# Patient Record
Sex: MALE | Race: WHITE | NOT HISPANIC OR LATINO | Employment: FULL TIME | URBAN - METROPOLITAN AREA
[De-identification: names, ages, dates, MRNs, and addresses within clinical notes are randomized per-mention and may not be internally consistent; named-entity substitution may affect disease eponyms.]

---

## 2021-06-03 ENCOUNTER — RECORDS - HEALTHEAST (OUTPATIENT)
Dept: ADMINISTRATIVE | Facility: CLINIC | Age: 36
End: 2021-06-03

## 2021-10-04 ENCOUNTER — HOSPITAL ENCOUNTER (EMERGENCY)
Facility: HOSPITAL | Age: 36
Discharge: HOME | End: 2021-10-04
Admitting: FAMILY MEDICINE
Payer: OTHER MISCELLANEOUS

## 2021-10-04 VITALS
TEMPERATURE: 97.9 F | OXYGEN SATURATION: 96 % | SYSTOLIC BLOOD PRESSURE: 134 MMHG | DIASTOLIC BLOOD PRESSURE: 94 MMHG | HEART RATE: 85 BPM | HEIGHT: 71 IN | WEIGHT: 210 LBS | RESPIRATION RATE: 17 BRPM | BODY MASS INDEX: 29.4 KG/M2

## 2021-10-04 DIAGNOSIS — M54.41 ACUTE RIGHT-SIDED LOW BACK PAIN WITH RIGHT-SIDED SCIATICA: Primary | ICD-10-CM

## 2021-10-04 PROCEDURE — 99284 EMERGENCY DEPT VISIT MOD MDM: CPT

## 2021-10-04 RX ORDER — GABAPENTIN 300 MG/1
300 CAPSULE ORAL 3 TIMES DAILY
Qty: 30 CAPSULE | Refills: 0 | Status: SHIPPED | OUTPATIENT
Start: 2021-10-04 | End: 2021-10-27

## 2021-10-04 RX ORDER — PREDNISONE 20 MG/1
TABLET ORAL
Qty: 10 TABLET | Refills: 0 | Status: SHIPPED | OUTPATIENT
Start: 2021-10-04 | End: 2021-10-27

## 2021-10-04 ASSESSMENT — ENCOUNTER SYMPTOMS
FREQUENCY: 1
ROS GI COMMENTS: NEGATIVE FOR BOWEL INCONTINENCE.
BACK PAIN: 1

## 2021-10-04 ASSESSMENT — MIFFLIN-ST. JEOR: SCORE: 1899.68

## 2021-10-04 NOTE — ED TRIAGE NOTES
Pt having increasing low back pain felt more on the right side radiating to upper thigh.  Denies loss of control of bowel or bladder but noticing increasing urination.  Has had work related injury of his back and had been having 3 weeks of PT.  Noticed that Fri Pt was more intensive and  Sunday pain got worse.  Would like to do an MRI

## 2021-10-04 NOTE — ED PROVIDER NOTES
EMERGENCY DEPARTMENT ENCOUNTER      NAME: Randy Hernandez  AGE: 36 year old male  YOB: 1985  MRN: [id number]  EVALUATION DATE & TIME: No admission date for patient encounter.    PCP: No primary care provider on file.    ED PROVIDER: Miki Hernandez M.D.    Chief Complaint   Patient presents with     Back Pain       FINAL IMPRESSION:  1. Acute right-sided low back pain with right-sided sciatica        ED COURSE & MEDICAL DECISION MAKING:    Pertinent Labs & Imaging studies personally reviewed and interpreted by me. (See chart for details)  2:14 PM Patient seen and examined, prior records reviewed.  Differential diagnosis includes but not limited to lumbar strain, lumbar sprain, vertebral fracture, compression fracture, cauda equina syndrome, epidural abscess, spondylolisthesis, discitis, osteomyelitis, aortic dissection, aortic aneurysm.  Patient presents with several weeks of back pain, worse over the last couple days.  Pain does radiate into the right upper thigh.  No saddle anesthesia, no bowel or bladder incontinence, no decreased sensation or strength of the lower extremities, no midline tenderness to percussion, no fever.  2:17 PM I discussed patient follow up with the spine center. We discussed the plan for discharge and the patient is agreeable. Reviewed supportive cares, symptomatic treatment, outpatient follow up, and reasons to return to the Emergency Department. Patient to be discharged by ED RN.         At the conclusion of the encounter I discussed the results of all of the tests and the disposition. The questions were answered. The patient or family acknowledged understanding and was agreeable with the care plan.     PROCEDURES:   Procedures    MEDICATIONS GIVEN IN THE EMERGENCY:  Medications - No data to display    NEW PRESCRIPTIONS STARTED AT TODAY'S ER VISIT  Discharge Medication List as of 10/4/2021  2:54 PM      START taking these medications    Details   gabapentin (NEURONTIN)  300 MG capsule Take 1 capsule (300 mg) by mouth 3 times daily, Disp-30 capsule, R-0, E-Prescribe      predniSONE (DELTASONE) 20 MG tablet Take two tablets (= 40mg) each day for 5 (five) days, Disp-10 tablet, R-0, E-Prescribe             =================================================================    HPI    Patient information was obtained from: Patient      Randy Hernandez is a 36 year old male who presents to this ED by walk in for evaluation of back pain.    The patient reports he sustained a lower back injury 5 weeks ago. He began physical therapy 3 weeks ago. This pat Friday his physical therapy was a little more aggressive. On Saturday and Sunday the pain in his lower back got really bad. It radiates into his buttocks, saddle area, and down to his right knee. His pain was is worse when sitting, and he feels like a pressure is building up in his back. His pain is the worst when he tries to stand from a sitting position. His current pain feels similar to the pain he had the week following his injury. He previously had a course of steroids to help with the pain that he finished 2 weeks ago. He is currently treating his pain with Tylenol and ibuprofen. He does report some recent urinary frequency. Denies any bowel or bladder incontinence or any other complaints at this time.    REVIEW OF SYSTEMS   Review of Systems   Gastrointestinal:        Negative for bowel incontinence.   Genitourinary: Positive for frequency. Negative for enuresis.   Musculoskeletal: Positive for back pain (low).      All other systems reviewed and negative    PAST MEDICAL HISTORY:  Past Medical History:   Diagnosis Date     Aneurysm (H)     basilar artery     DJD (degenerative joint disease)        PAST SURGICAL HISTORY:  No past surgical history on file.    CURRENT MEDICATIONS:    No current facility-administered medications for this encounter.     Current Outpatient Medications   Medication     gabapentin (NEURONTIN) 300 MG capsule  "    predniSONE (DELTASONE) 20 MG tablet     dicyclomine (BENTYL) 20 mg tablet     ondansetron (ZOFRAN ODT) 8 MG disintegrating tablet     ondansetron (ZOFRAN) 4 MG tablet       ALLERGIES:  Allergies   Allergen Reactions     Codeine Unknown     Sulfa (Sulfonamide Antibiotics) [Sulfa Drugs] Unknown       FAMILY HISTORY:  No family history on file.    SOCIAL HISTORY:   Social History     Socioeconomic History     Marital status: Single     Spouse name: Not on file     Number of children: Not on file     Years of education: Not on file     Highest education level: Not on file   Occupational History     Not on file   Tobacco Use     Smoking status: Never Smoker   Substance and Sexual Activity     Alcohol use: No     Drug use: Yes     Types: Marijuana     Sexual activity: Not on file   Other Topics Concern     Not on file   Social History Narrative     Not on file     Social Determinants of Health     Financial Resource Strain:      Difficulty of Paying Living Expenses:    Food Insecurity:      Worried About Running Out of Food in the Last Year:      Ran Out of Food in the Last Year:    Transportation Needs:      Lack of Transportation (Medical):      Lack of Transportation (Non-Medical):    Physical Activity:      Days of Exercise per Week:      Minutes of Exercise per Session:    Stress:      Feeling of Stress :    Social Connections:      Frequency of Communication with Friends and Family:      Frequency of Social Gatherings with Friends and Family:      Attends Anabaptism Services:      Active Member of Clubs or Organizations:      Attends Club or Organization Meetings:      Marital Status:    Intimate Partner Violence:      Fear of Current or Ex-Partner:      Emotionally Abused:      Physically Abused:      Sexually Abused:        VITALS:  BP (!) 134/94   Pulse 85   Temp 97.9  F (36.6  C) (Temporal)   Resp 17   Ht 1.803 m (5' 11\")   Wt 95.3 kg (210 lb)   SpO2 96%   BMI 29.29 kg/m      PHYSICAL EXAM:  Physical " Exam  Vitals and nursing note reviewed.   Constitutional:       Appearance: Normal appearance.   HENT:      Head: Normocephalic and atraumatic.      Right Ear: External ear normal.      Left Ear: External ear normal.      Nose: Nose normal.   Eyes:      Extraocular Movements: Extraocular movements intact.      Conjunctiva/sclera: Conjunctivae normal.      Pupils: Pupils are equal, round, and reactive to light.   Pulmonary:      Effort: Pulmonary effort is normal.   Musculoskeletal:         General: No swelling or deformity. Normal range of motion.      Cervical back: Normal range of motion.      Comments: Right lower lumbar tenderness. Decreased flexion and decreased extension of the lumbar spine.   Neurological:      General: No focal deficit present.      Mental Status: He is alert and oriented to person, place, and time. Mental status is at baseline.   Psychiatric:         Mood and Affect: Mood normal.         Behavior: Behavior normal.         Thought Content: Thought content normal.       I, Ole Reed, am serving as a scribe to document services personally performed by Dr. Hernandez based on my observation and the provider's statements to me. I, Miki Hernandez MD attest that Ole Reed is acting in a scribe capacity, has observed my performance of the services and has documented them in accordance with my direction.    Miki Hernandez M.D.  Emergency Medicine  [facility] EMERGENCY DEPARTMENT  [facility] [facility] [id number] [phone]  Dept: [phone]     Miki Hernandez MD  06/12/22 0859

## 2021-10-08 ENCOUNTER — OFFICE VISIT (OUTPATIENT)
Dept: PHYSICAL MEDICINE AND REHAB | Facility: CLINIC | Age: 36
End: 2021-10-08
Attending: FAMILY MEDICINE
Payer: OTHER MISCELLANEOUS

## 2021-10-08 VITALS
WEIGHT: 210 LBS | BODY MASS INDEX: 29.4 KG/M2 | HEIGHT: 71 IN | DIASTOLIC BLOOD PRESSURE: 76 MMHG | SYSTOLIC BLOOD PRESSURE: 122 MMHG | TEMPERATURE: 98.1 F | HEART RATE: 79 BPM

## 2021-10-08 DIAGNOSIS — M54.41 ACUTE RIGHT-SIDED LOW BACK PAIN WITH RIGHT-SIDED SCIATICA: ICD-10-CM

## 2021-10-08 DIAGNOSIS — M51.26 LUMBAR DISC HERNIATION: Primary | ICD-10-CM
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
## 2021-10-08 PROCEDURE — 99215 OFFICE O/P EST HI 40 MIN: CPT | Performed by: PHYSICAL MEDICINE & REHABILITATION

## 2021-10-08 RX ORDER — NABUMETONE 500 MG/1
TABLET, FILM COATED ORAL
Qty: 90 TABLET | Refills: 1 | Status: SHIPPED | OUTPATIENT
Start: 2021-10-08

## 2021-10-08 RX ORDER — HYDROCODONE BITARTRATE AND ACETAMINOPHEN 5; 325 MG/1; MG/1
TABLET ORAL
Qty: 21 TABLET | Refills: 0 | Status: SHIPPED | OUTPATIENT
Start: 2021-10-08

## 2021-10-08 RX ORDER — GABAPENTIN 300 MG/1
CAPSULE ORAL
Qty: 270 CAPSULE | Refills: 2 | Status: SHIPPED | OUTPATIENT
Start: 2021-10-08

## 2021-10-08 ASSESSMENT — PAIN SCALES - GENERAL: PAINLEVEL: MILD PAIN (3)

## 2021-10-08 ASSESSMENT — MIFFLIN-ST. JEOR: SCORE: 1899.68
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
## 2021-10-08 NOTE — PATIENT INSTRUCTIONS
Randy    It was very nice to meet you.  I am sorry that you continue to have pain following your work injury.    MRI of your low back has been ordered today. Someone will call you to schedule this imaging study.  Please call the clinic at [phone] if no one calls you to schedule this.  After you have scheduled this, please call my office at [phone] to schedule an appointment to review the results.  Please make this appointment for at least 3 days after your imaging study to ensure that the radiologist has finalized the report by the time of your appointment.  If you have any difficulty scheduling an appointment in a timely manner, please call my nurse line at [phone].    Nabumetone (which is an anti-inflammatory) medication is prescribed today (please start this after you finish the course of prednisone).  Take 1 tablet 3 times a day as needed for pain.  This medication should be taken with food and water to prevent any stomach upset.  Do not take ibuprofen/Advil/Motrin/Aleve/naproxen while you take Nabumetone.  Please call if you have any side effects to  Nabumetone.  Please stop this medication if you develop any symptoms of COVID-19.      Norco 5/325 mg, 1 tablet at bedtime as needed for pain, number 21 tablets as prescribed today..  Please lock this medication up when you are not taking it.  Do not share this medication with other people.  Do not increase the dose without permission from your physician.  Do not drink alcohol while you take this medication as this can lead to death.  Do not take other pain medications without approval from your physician or this can also lead to death.  If you need a refill of this medication, you must come in to clinic by appointment.  Please call if you have any questions on how to take this medication.    Gabapentin (nerve pain medication) was prescribed today.  Please use the chart to increase the dose to an amount that controls your pain (do not exceed 3  tablets three times a day).  If you have any questions on how to increase the dose or any side effects to this medication, please call the clinic.    Gabapentin 300mg Dosing Chart    DATE  MORNING AFTERNOON BEDTIME    Day 1 0 0 1    Day 2 0 0 1    Day 3 0 0 1    Day 4 1 0 1    Day 5 1 0 1    Day 6 1 0 1    Day 7 1 1 1    Day 8 1 1 1    Day 9 1 1 1    Day 10 1 1 2    Day 11 1 1 2    Day 12 1 1 2    Day 13 2 1 2    Day 14 2 1 2    Day 15 2 1 2    Day 16 2 2 2    Day 17 2 2 2    Day 18 2 2 2    Day 19 2 2 3    Day 20 2 2 3    Day 21 2 2 3    Day 22 3 2 3    Day 23 3 2 3    Day 24 3 2 3    Day 25 3 3 3    Day 26 3 3 3    Day 27 3 3 3     Continue medication, taking 3 capsules three times daily    Please call if you have any questions regarding how to take your medication  Clinic Phone # [phone]        If you have any questions or concerns prior to your follow-up appointment, please call the nurse line at [phone].    Thanks, Randy!  Dr. Ro

## 2021-10-15 ENCOUNTER — HOSPITAL ENCOUNTER (OUTPATIENT)
Dept: MRI IMAGING | Facility: HOSPITAL | Age: 36
Discharge: HOME | End: 2021-10-15
Attending: PHYSICAL MEDICINE & REHABILITATION | Admitting: PHYSICAL MEDICINE & REHABILITATION
Payer: OTHER MISCELLANEOUS

## 2021-10-15 ENCOUNTER — TELEPHONE (OUTPATIENT)
Dept: PHYSICAL MEDICINE AND REHAB | Facility: CLINIC | Age: 36
End: 2021-10-15

## 2021-10-15 DIAGNOSIS — M54.41 ACUTE RIGHT-SIDED LOW BACK PAIN WITH RIGHT-SIDED SCIATICA: ICD-10-CM

## 2021-10-15 PROCEDURE — 72148 MRI LUMBAR SPINE W/O DYE: CPT

## 2021-10-15 NOTE — TELEPHONE ENCOUNTER
"----- Message from Collette Ordoñez PA-C sent at 10/15/2021  3:35 PM CDT -----  Please call this patient and let him know that I reviewed the MRI lumbar spine since Dr. Ro is out of the office.  This shows mild disc bulges L3-4, L4-5, and L5-S1.  He should follow up with Dr. Ro to review.    Per Dr. Ro's note \"I will plan to see the patient back in person after he has had his MRI.  If he cannot get in in a timely fashion, he is asked to call the nurse navigation team who will notify me and I will find a spot that I can see him.\"  "

## 2021-10-15 NOTE — TELEPHONE ENCOUNTER
Call placed to patient with provider's results and recommendations.  Pt stated understanding. Pt was transferred to scheduling to make appt.

## 2021-10-16 ENCOUNTER — HEALTH MAINTENANCE LETTER (OUTPATIENT)
Age: 36
End: 2021-10-16

## 2021-10-26 NOTE — PROGRESS NOTES
Assessment:   Randy Hernandez is a 36 year old y.o. male with past medical history significant for asthma, irritable bowel syndrome, acid reflux, brain aneurysm who presents today for follow-up regarding acute right-sided low back pain with right radicular/sciatic type leg symptoms that started after work injury on August 31 of 2021.  At that time he was lifting a 350 pound piece of equipment.  Since his initial evaluation on October 8 of 2021, he has undergone an MRI of the lumbar spine which shows a slight disc protrusion at the L4-5 level which causes mild lateral recess stenosis without foraminal stenosis.  At this point his pain and mechanism of injury is most consistent with a lumbar sprain/strain.  He is noting improvement at this time which is in line with the timeframe expected for soft tissue injury to heal.  He is neurologically intact and without any red flag symptoms.    PSP:  Dr. Ro       Plan:     A shared decision making plan was used.  The patient's values and choices were respected.  The following represents what was discussed and decided upon by the physician and the patient.      1.  DIAGNOSTIC TESTS: The patient's MRI of the lumbar spine from October 15, 2021 was personally reviewed today by the physician with the physician performing her own interpretation.  There is a slight disc protrusion at the L4-5 level which causes lateral recess stenosis but no central canal stenosis or neural stenosis at this level.  There is a slight disc bulge with slight disc degeneration at the L5-S1 level, but there is no lateral recess stenosis, central canal stenosis or neuroforaminal stenosis at this level.  There is minimal facet arthropathy seen at the L3-4, L4-5, L5-S1 levels.  Otherwise this is a largely normal MRI.  2.  PHYSICAL THERAPY: The patient stopped physical therapy after it previously aggravated his symptoms significantly.  At this point he is doing well and he is interested to return to  physical therapy which I do think would be quite beneficial for him at this time to finish his rehabilitation course.  An order was provided to the [facility] location, where he was previously established as a patient.  He is encouraged to continue doing his home exercise program on a regular basis.  3.  MEDICATIONS: Patient has completed the prednisone course.  -At the last visit he was prescribed nabumetone 500 mg 3 times a day as needed for pain.  At this time, can continue to take this as needed.  -At the last visit, he was asked to increase the gabapentin.  He is currently taking gabapentin 100 mg in the morning, 600 mg in the afternoon and 900 mg at bedtime.  -At the last visit he was given Norco 5/325 mg, 1 tablet at bedtime as needed for pain.  At this time, he states that he never took the medication as he was afraid to take it in combination with the gabapentin.  He does not need to take it at this time if he does not have significant pain, though he does have a flareup after physical therapy starts, you could take it at bedtime to help him sleep.  4.  INTERVENTIONS: His pain is significantly better so I do not feel that any interventions are necessary at this time.  I do not anticipate that he would need injections given that his pain is most consistent with soft tissue injury.  5.  PATIENT EDUCATION:    -I did reassure the patient that his MRI findings are largely normal.  Again this lends itself to a lumbar sprain/strain injury after lifting heavy.  He is feeling significantly better, which is within the timeframe note a soft tissue injury would be expected to heal.  -He is in agreement with returning to physical therapy.  -He reports that he tried to return to work, but his work cannot accommodate the no ladders/stairs.  He reports that while stairs do somewhat aggravate pain, he does think that he would be able to do them.  He says he is concerned about being up on the ladder.  He  is in agreement that he could do stairs but not the latter and then his work might be able to accommodate the restrictions.  Will discuss with his primary care provider next week at his appointment.  -His current work restrictions say that he can frequently carry 0 to 10 pounds with occasional lifting of 11 to 20 pounds.  No lifting greater than 20 pounds.  He can push pull occasionally up to 25 pounds but not greater than 25 pounds.  He can twist/turn occasionally.  He is not to kneel, squat, use a ladder or stairs.  He can rotate activities and positions continuously, do frequent overhead reaching, frequent standing and walking, and occasional sitting.  -I do anticipate at the next visit after he has nearly completed physical therapy that we will be able to either further advance the restrictions or returning to work unrestricted.  His prognosis for return to work unrestricted is excellent.  He may benefit from a back brace when he is doing heavier work just to help protect the back and remind him of good body mechanics.  6.  FOLLOW-UP: Patient is asked to follow-up in person in 4 weeks (he can push this back further if he has not done physical therapy yet or has only done 1-2 visits and would like to do more visits before he follows up).  If there are any questions/concerns or any significant worsening of pain prior to that time, the patient is asked to call the clinic via the nurse navigation line or via [facility].     A total of 36 minutes was spent in the care of this patient on the day of service.    Subjective:     Randy Hernandez is a 36 year old male who presents today for follow-up regarding acute flare of low back pain that occurred after a work injury.  Since he was last seen on October 8 of 2021, he does report that things have improved.  He reports complete resolution of the radicular leg pain, he is just having a dull pain in the low back.  This tends to be more midline, and it does go down into the  sacral area.  He denies any numbness, tingling, weakness in the legs.  He reports that he has been taking it pretty easy in the last few weeks.  He is interested to know what his MRI shows, but does feel like with the improvement he would be willing to go back to physical therapy.  He is currently rating his pain today at a 2 out of 10.  At worst it is a 10 out of 10.  At best it is a 2 out of 10.  His pain is worse with too much activity.  Shopping for groceries with many trips, doing a lot of up and down on the stairs, sitting upright for long periods will aggravate his pain.  He does feel better with taking the medications.  He is currently taking gabapentin 600 mg in the morning, 600 mg in the afternoon, 900 mg at bedtime, and taking nabumetone 500 mg 3 times a day as needed for pain.  He says that these are quite helpful.  He admits that he never ended up taking the Norco, because he was afraid of taking the Norco with the gabapentin.  The combination of those 2 scared him, so he did not end up taking it.  If he is careful with his movements, and he switches his positions or if he sits slouched, his pain will feel better.  He denies any new symptoms at this time.  He has not returned to physical therapy since it initially aggravated his pain significantly.  Again he would like to know his MRI results and the next steps.    He says that his primary care provider did try to have her return to work, but his work could not accommodate the restriction that said no stairs.  In looking at the form, it does have stairs and ladders together.  While he feels like he could do the stairs, he is hesitant to be on a ladder.    Past medical history is reviewed and is unchanged for any new medical diagnoses in the interim.      Family history is reviewed and is unchanged in the interim.      Review of Systems:    Pertinent negatives include no this, tingling, weakness, headaches, fevers, chills, unexplained weight loss, bowel  incontinence, bladder incontinence, trips, stumbles, falls.  All others reviewed and are negative.     Objective:   CONSTITUTIONAL:  Vital signs as above.  No acute distress.  The patient is well nourished and well groomed.    PSYCHIATRIC:  The patient is awake, alert, oriented to person, place and time.  The patient is answering questions appropriately with clear speech.  Normal affect.  SKIN:  Skin over the face, posterior torso, bilateral upper and lower extremities is clean, dry, intact without rashes.  MUSCULOSKELETAL:  Gait is non-antalgic.  The patient is able to heel and toe walk without any difficulty.  No significant tenderness over the bilateral lumbar paraspinal muscles.     He notes some mild discomfort with lumbar flexion, and increased discomfort with lumbar extension.  Mild discomfort with bilateral lumbar sidebending, slightly worse going to the left side compared to the right.  Mild discomfort with bilateral trunk rotation, slightly worse going to the left side compared to the right.  He does have significant pain with bilateral lumbar facet loading (simultaneous extension rotation, with pain localizing to the ipsilateral side of rotation), though significantly worse on the right side compared to the left.  The patient has 5/5 strength for the bilateral hip flexors, knee flexors/extensors, ankle dorsiflexors/plantar flexors, ankle evertors/invertors.  Negative straight leg exam test bilaterally.  The patient does not have any pain with bilateral hip range of motion testing.  Fabere's test is negative bilaterally.  Gaenslen's test is negative bilaterally.  Yeomans test is negative bilaterally.  NEUROLOGICAL: 2/4 patellar, medial hamstring, achilles reflexes which are symmetric bilaterally.  No ankle clonus bilaterally.  Sensation to light touch is intact in the bilateral L4, L5, and S1 dermatomes.

## 2021-10-27 ENCOUNTER — OFFICE VISIT (OUTPATIENT)
Dept: PHYSICAL MEDICINE AND REHAB | Facility: CLINIC | Age: 36
End: 2021-10-27
Payer: OTHER MISCELLANEOUS

## 2021-10-27 VITALS — HEART RATE: 96 BPM | DIASTOLIC BLOOD PRESSURE: 83 MMHG | TEMPERATURE: 98.6 F | SYSTOLIC BLOOD PRESSURE: 136 MMHG

## 2021-10-27 DIAGNOSIS — S33.5XXD LUMBAR SPRAIN, SUBSEQUENT ENCOUNTER: ICD-10-CM

## 2021-10-27 DIAGNOSIS — M54.41 ACUTE RIGHT-SIDED LOW BACK PAIN WITH RIGHT-SIDED SCIATICA: Primary | ICD-10-CM

## 2021-10-27 DIAGNOSIS — M51.369 BULGING LUMBAR DISC: ICD-10-CM

## 2021-10-27 PROCEDURE — 99214 OFFICE O/P EST MOD 30 MIN: CPT | Performed by: PHYSICAL MEDICINE & REHABILITATION

## 2021-10-27 ASSESSMENT — PAIN SCALES - GENERAL: PAINLEVEL: MILD PAIN (2)

## 2021-10-27 NOTE — PATIENT INSTRUCTIONS
Randy    I have placed an order for physical therapy.  Someone should call you from [facility] to schedule this, or you are welcome to call them and schedule.  I do think that you will likely have some mild pain when you first begin doing physical therapy again.  If the pain is excruciating, please contact me through [facility] to let me know right away.  Otherwise please do expect to have some mild pain when you are for starting out.  It will resolve with time, and this is completely normal in the healing/rehabilitation process.    Please continue to take the gabapentin medication, though you can decrease down to 1 capsule in the morning, 1 capsule in the afternoon and 2 or 3 capsules at bedtime.  When you feel ready to discontinue this medication, please let me know and I will be happy to give you instructions on how you can taper down and then stop the medication.    When you see your primary care provider next week, please ask her if she can differentiate between doing stairs and using a ladder.  I do think that you would be cleared to do stairs.    I would like to see you back in follow-up in 4 weeks.  If you are doing significantly better at that time we can either advance your work restrictions or returning to work unrestricted.  Please reach out at any point before then if you have any questions, concerns, or any significant worsening of your pain.    Thanks, Randy!  Dr. Ro

## 2021-10-27 NOTE — LETTER
10/27/2021         RE: Randy Hernandez  [address]  [facility] [address]        Dear Colleague,    Thank you for referring your patient, Randy Hernandez, to the [facility] [place]. Please see a copy of my visit note below.    Assessment:   Randy Hernandez is a 36 year old y.o. male with past medical history significant for asthma, irritable bowel syndrome, acid reflux, brain aneurysm who presents today for follow-up regarding acute right-sided low back pain with right radicular/sciatic type leg symptoms that started after work injury on August 31 of 2021.  At that time he was lifting a 350 pound piece of equipment.  Since his initial evaluation on October 8 of 2021, he has undergone an MRI of the lumbar spine which shows a slight disc protrusion at the L4-5 level which causes mild lateral recess stenosis without foraminal stenosis.  At this point his pain and mechanism of injury is most consistent with a lumbar sprain/strain.  He is noting improvement at this time which is in line with the timeframe expected for soft tissue injury to heal.  He is neurologically intact and without any red flag symptoms.    PSP:  Dr. Ro       Plan:     A shared decision making plan was used.  The patient's values and choices were respected.  The following represents what was discussed and decided upon by the physician and the patient.      1.  DIAGNOSTIC TESTS: The patient's MRI of the lumbar spine from October 15, 2021 was personally reviewed today by the physician with the physician performing her own interpretation.  There is a slight disc protrusion at the L4-5 level which causes lateral recess stenosis but no central canal stenosis or neural stenosis at this level.  There is a slight disc bulge with slight disc degeneration at the L5-S1 level, but there is no lateral recess stenosis, central canal stenosis or neuroforaminal stenosis at this level.  There is minimal facet  arthropathy seen at the L3-4, L4-5, L5-S1 levels.  Otherwise this is a largely normal MRI.  2.  PHYSICAL THERAPY: The patient stopped physical therapy after it previously aggravated his symptoms significantly.  At this point he is doing well and he is interested to return to physical therapy which I do think would be quite beneficial for him at this time to finish his rehabilitation course.  An order was provided to the [facility] location, where he was previously established as a patient.  He is encouraged to continue doing his home exercise program on a regular basis.  3.  MEDICATIONS: Patient has completed the prednisone course.  -At the last visit he was prescribed nabumetone 500 mg 3 times a day as needed for pain.  At this time, can continue to take this as needed.  -At the last visit, he was asked to increase the gabapentin.  He is currently taking gabapentin 100 mg in the morning, 600 mg in the afternoon and 900 mg at bedtime.  -At the last visit he was given Norco 5/325 mg, 1 tablet at bedtime as needed for pain.  At this time, he states that he never took the medication as he was afraid to take it in combination with the gabapentin.  He does not need to take it at this time if he does not have significant pain, though he does have a flareup after physical therapy starts, you could take it at bedtime to help him sleep.  4.  INTERVENTIONS: His pain is significantly better so I do not feel that any interventions are necessary at this time.  I do not anticipate that he would need injections given that his pain is most consistent with soft tissue injury.  5.  PATIENT EDUCATION:    -I did reassure the patient that his MRI findings are largely normal.  Again this lends itself to a lumbar sprain/strain injury after lifting heavy.  He is feeling significantly better, which is within the timeframe note a soft tissue injury would be expected to heal.  -He is in agreement with returning to physical  therapy.  -He reports that he tried to return to work, but his work cannot accommodate the no ladders/stairs.  He reports that while stairs do somewhat aggravate pain, he does think that he would be able to do them.  He says he is concerned about being up on the ladder.  He is in agreement that he could do stairs but not the latter and then his work might be able to accommodate the restrictions.  Will discuss with his primary care provider next week at his appointment.  -His current work restrictions say that he can frequently carry 0 to 10 pounds with occasional lifting of 11 to 20 pounds.  No lifting greater than 20 pounds.  He can push pull occasionally up to 25 pounds but not greater than 25 pounds.  He can twist/turn occasionally.  He is not to kneel, squat, use a ladder or stairs.  He can rotate activities and positions continuously, do frequent overhead reaching, frequent standing and walking, and occasional sitting.  -I do anticipate at the next visit after he has nearly completed physical therapy that we will be able to either further advance the restrictions or returning to work unrestricted.  His prognosis for return to work unrestricted is excellent.  He may benefit from a back brace when he is doing heavier work just to help protect the back and remind him of good body mechanics.  6.  FOLLOW-UP: Patient is asked to follow-up in person in 4 weeks (he can push this back further if he has not done physical therapy yet or has only done 1-2 visits and would like to do more visits before he follows up).  If there are any questions/concerns or any significant worsening of pain prior to that time, the patient is asked to call the clinic via the nurse navigation line or via [facility].     A total of 36 minutes was spent in the care of this patient on the day of service.    Subjective:     Randy Hernandez is a 36 year old male who presents today for follow-up regarding acute flare of low back pain that occurred  after a work injury.  Since he was last seen on October 8 of 2021, he does report that things have improved.  He reports complete resolution of the radicular leg pain, he is just having a dull pain in the low back.  This tends to be more midline, and it does go down into the sacral area.  He denies any numbness, tingling, weakness in the legs.  He reports that he has been taking it pretty easy in the last few weeks.  He is interested to know what his MRI shows, but does feel like with the improvement he would be willing to go back to physical therapy.  He is currently rating his pain today at a 2 out of 10.  At worst it is a 10 out of 10.  At best it is a 2 out of 10.  His pain is worse with too much activity.  Shopping for groceries with many trips, doing a lot of up and down on the stairs, sitting upright for long periods will aggravate his pain.  He does feel better with taking the medications.  He is currently taking gabapentin 600 mg in the morning, 600 mg in the afternoon, 900 mg at bedtime, and taking nabumetone 500 mg 3 times a day as needed for pain.  He says that these are quite helpful.  He admits that he never ended up taking the Norco, because he was afraid of taking the Norco with the gabapentin.  The combination of those 2 scared him, so he did not end up taking it.  If he is careful with his movements, and he switches his positions or if he sits slouched, his pain will feel better.  He denies any new symptoms at this time.  He has not returned to physical therapy since it initially aggravated his pain significantly.  Again he would like to know his MRI results and the next steps.    He says that his primary care provider did try to have her return to work, but his work could not accommodate the restriction that said no stairs.  In looking at the form, it does have stairs and ladders together.  While he feels like he could do the stairs, he is hesitant to be on a ladder.    Past medical history is  reviewed and is unchanged for any new medical diagnoses in the interim.      Family history is reviewed and is unchanged in the interim.      Review of Systems:    Pertinent negatives include no this, tingling, weakness, headaches, fevers, chills, unexplained weight loss, bowel incontinence, bladder incontinence, trips, stumbles, falls.  All others reviewed and are negative.     Objective:   CONSTITUTIONAL:  Vital signs as above.  No acute distress.  The patient is well nourished and well groomed.    PSYCHIATRIC:  The patient is awake, alert, oriented to person, place and time.  The patient is answering questions appropriately with clear speech.  Normal affect.  SKIN:  Skin over the face, posterior torso, bilateral upper and lower extremities is clean, dry, intact without rashes.  MUSCULOSKELETAL:  Gait is non-antalgic.  The patient is able to heel and toe walk without any difficulty.  No significant tenderness over the bilateral lumbar paraspinal muscles.     He notes some mild discomfort with lumbar flexion, and increased discomfort with lumbar extension.  Mild discomfort with bilateral lumbar sidebending, slightly worse going to the left side compared to the right.  Mild discomfort with bilateral trunk rotation, slightly worse going to the left side compared to the right.  He does have significant pain with bilateral lumbar facet loading (simultaneous extension rotation, with pain localizing to the ipsilateral side of rotation), though significantly worse on the right side compared to the left.  The patient has 5/5 strength for the bilateral hip flexors, knee flexors/extensors, ankle dorsiflexors/plantar flexors, ankle evertors/invertors.  Negative straight leg exam test bilaterally.  The patient does not have any pain with bilateral hip range of motion testing.  Fabere's test is negative bilaterally.  Gaenslen's test is negative bilaterally.  Yeomans test is negative bilaterally.  NEUROLOGICAL: 2/4 patellar,  medial hamstring, achilles reflexes which are symmetric bilaterally.  No ankle clonus bilaterally.  Sensation to light touch is intact in the bilateral L4, L5, and S1 dermatomes.               Again, thank you for allowing me to participate in the care of your patient.        Sincerely,        Heather Hein, DO

## 2021-11-17 NOTE — PROGRESS NOTES
Assessment:   Randy Hernandez is a 36 year old y.o. male with past medical history significant for asthma, irritable bowel syndrome, acid reflux, brain aneurysm who presents today for follow-up regarding acute right-sided low back pain with right radicular/sciatic type leg symptoms that started after work injury on August 31 of 2021.  At that time he was lifting a 350 pound piece of equipment.  His MRI did show a slight disc protrusion at the L4-5 level with lateral recess stenosis, but this was thought to be more of an incidental finding given that his pain was resolving.  His pain was thought to be most consistent with a soft tissue injury of the lumbar sprain/strain.  He had a recent flare of his pain about 2 to 3 weeks ago that has now resolved and he is back at baseline from where he was out of his last visit.  He does have a significant amount of myofascial pain in the glutes medius area.  The pain is manageable at this time.  He remains neurologically intact and without any red flag symptoms.    PSP:  Dr. Ro       Plan:     A shared decision making plan was used.  The patient's values and choices were respected.  The following represents what was discussed and decided upon by the physician and the patient.      1.  DIAGNOSTIC TESTS: The patient's MRI of the lumbar spine from October 15, 2021 was personally reviewed today by the physician with the physician performing her own interpretation.  There is a slight disc protrusion at the L4-5 level which causes lateral recess stenosis but no central canal stenosis or neural foraminal stenosis at this level.  There is a slight disc bulge with slight disc degeneration at the L5-S1 level but there is no lateral recess stenosis, central canal stenosis, or neuroforaminal stenosis noted.  There is minimal facet arthropathy seen at the L3-4, L4-5, L5-S1 levels.  This is a largely normal MRI.  -His work comp required a urine drug screen today.  This was performed by  the [facility] staff.  2.  PHYSICAL THERAPY: At the last visit, the patient was encouraged to return to physical therapy.  At this time, has been going to [facility].  However I am unimpressed with his physical therapy program.  They have been doing more passive modalities such as decompression, traction, TENS, walking on the treadmill.  I specifically asked for a home exercise program that should be strength-based.  At this point he has 1 more session with [facility].  He can complete the session and then I have requested that work comp approval 4-6 sessions with the spine physical therapist here at the spine center.  I did send a message to them today requesting that they see him as soon as possible given that this is a work comp case (though I did let the patient know that they are booking out until the end of December at this point).  I have also asked him to specifically work on glutes and hamstring strengthening with him.  3.  MEDICATIONS:    -He can continue with the nabumetone 500 mg 3 times a day as needed for pain.  -He is weaning off the gabapentin at this time.  The 900 mg 3 times a day made him too tired.  He can stay on the gabapentin 300 mg once a day or he can wean completely off of it  -He was previously prescribed Norco, but was too afraid to take it.  Reports that he has taken it now, but only when he has had a bad night during a flareup.  He does not need a refill of this medication at this time.  4.  INTERVENTIONS: Injections are not deemed necessary, given that his pain has improved and is manageable.  5.  PATIENT EDUCATION:    -Did show him how to do the pelvic bridging exercise.  I showed him how to modify it to activate the glutes and hamstring muscles.  I did modify the glutes bridging exercise that he is actually activating more of his glutes muscles.  He did report that this caused some pain.  I explained that this is somewhat normal.  He should not do very many repetitions as long as it  causes pain, but the more he does this exercise less painful it should become.  He is asked to do at least 10 repetitions per set, total of 3 sets per day with each variation (total of 6 sets).  He can make this harder by elevating his feet up on a chair or by doing them single-leg.  -He was shown how to use a lacrosse ball to roll out the gluteus medius area.  He is asked to do this for 30 to 60 seconds 2-3 times per day.  He should try to roll twice as long on the right side compared to the left  -At the last visit, his work restrictions stated that he could carry 0 to 10 pounds with occasional lifting of 11 to 20 pounds.  No lifting greater than 20 pounds.  He can push/pull occasionally up to 25 pounds but not greater than 25 pounds.  He can twist/turn occasionally.  He can occasionally use the stairs.  He is not to kneel, squat, use a ladder.  He can rotate activities and physicians continuously.  He can do frequent overhead reaching, frequent standing, frequent walking, occasional sitting.  At this time, I am going to keep the restrictions the same till he can complete the strength-based physical therapy.  His QR C did asked that I take over the restrictions which I am happy to do at this point  -He is going to make an appointment with his primary care physician regarding the work restrictions.  I explained that since I am taking these over he does not need to have that appointment unless he has other health concerns that he would like to discuss with his primary care physician.  6.  FOLLOW-UP: Approximately 4 to 6 weeks (after he has completed physical therapy).  If there are any questions/concerns or any significant worsening of pain prior to that time, the patient is asked to call the clinic via the nurse navigation line or via [employer].     A total of 28 minutes was spent in the care of this patient on the day of service.    Subjective:     Randy Hernandez is a 36 year old male who presents today for  follow-up regarding subacute right greater than left low back pain with previous sciatica type symptoms.  Patient reports that about a week after his last visit, he did have a flareup of his pain.  He says that he was coughing after drinking some water and then he had a severe flareup.  Reports that the pain was pretty bad for 4 days, and that has been improving since that time.  Reports that today he feels like the pain is back at the level that it was bad at his previous visit.  He says that it was frustrating to have a flareup like that.    He has been going to physical therapy.  They have been doing more passive treatment such as decompression, traction, using a TENS unit.  They have him walk on a treadmill.  He does stretching/range of motion exercises but he is really not able to describe or demonstrate any strengthening exercises.    He says that his work was not able to accommodate his restrictions so he has continued to be off work at this point.    This current pain is located across the low back.  He has had some pain that radiates into the right lateral hip area.  It does not go down the right leg.  He denies any numbness tingling or weakness in the legs.  He rates his pain today at a 3 out of 10.  At worst it is a 10 out of 10.  At best is a 1 out of 10.  His pain is worse with increased activity.  Sitting or standing in the same position for long periods can aggravate the pain.  Walking can also aggravate the right hip pain.  He does feel better with stretching, laying down.  The decompression and traction physical therapy do seem to be helping.  He states that he tried the gabapentin 900 mg 3 times a day but it made him very tired so he is weaned down to 300 mg once a day.  He is taking the nabumetone about once a day as needed for pain.  He did take the Norco a couple of times at night during the flareup.  He reports that otherwise he does not take the Norco on a regular basis.    Past medical  history is reviewed and is unchanged for any new medical diagnoses in the interim.      Family history is reviewed and is unchanged in the interim.      Review of Systems:  Positive for occasional headaches..  Pertinent negatives include no numbness, tingling, weakness, fevers, chills, unexplained weight loss, bowel incontinence, bladder incontinence, trips, stumbles, falls.  All others reviewed and are negative.     Objective:   CONSTITUTIONAL:  Vital signs as above.  No acute distress.  The patient is well nourished and well groomed.    PSYCHIATRIC:  The patient is awake, alert, oriented to person, place and time.  The patient is answering questions appropriately with clear speech.  Normal affect.  SKIN:  Skin over the face, posterior torso, bilateral upper and lower extremities is clean, dry, intact without rashes.  MUSCULOSKELETAL:  Gait is non-antalgic.  The patient is able to heel and toe walk without any difficulty.  Mild tenderness over the bilateral lumbar paraspinal muscles with significant tenderness over the bilateral gluteus medius muscles.  He does report some pain in the right buttock and right hip area with lumbar flexion.  Mild discomfort with lumbar extension.  He does have pain with right trunk rotation and left lumbar sidebending..      The patient has 5/5 strength for the bilateral hip flexors, knee flexors/extensors, ankle dorsiflexors/plantar flexors, ankle evertors/invertors.    NEUROLOGICAL: 2+/4 patellar, medial hamstring, achilles reflexes which are symmetric bilaterally.  No ankle clonus bilaterally.  Sensation to light touch is mildly impaired in the right L5 dermatome compared to the left L5 dermatome.  Sensation to light touch is intact in the bilateral L4 and S1 dermatomes

## 2021-11-23 ENCOUNTER — OFFICE VISIT (OUTPATIENT)
Dept: PHYSICAL MEDICINE AND REHAB | Facility: CLINIC | Age: 36
End: 2021-11-23
Payer: OTHER MISCELLANEOUS

## 2021-11-23 VITALS — SYSTOLIC BLOOD PRESSURE: 140 MMHG | OXYGEN SATURATION: 99 % | HEART RATE: 80 BPM | DIASTOLIC BLOOD PRESSURE: 98 MMHG

## 2021-11-23 DIAGNOSIS — S33.5XXD LUMBAR SPRAIN, SUBSEQUENT ENCOUNTER: ICD-10-CM

## 2021-11-23 DIAGNOSIS — M54.41 ACUTE RIGHT-SIDED LOW BACK PAIN WITH RIGHT-SIDED SCIATICA: Primary | ICD-10-CM

## 2021-11-23 PROCEDURE — 99214 OFFICE O/P EST MOD 30 MIN: CPT | Performed by: PHYSICAL MEDICINE & REHABILITATION

## 2021-11-23 ASSESSMENT — PAIN SCALES - GENERAL: PAINLEVEL: MILD PAIN (3)

## 2021-11-23 NOTE — LETTER
November 23, 2021      Randy Hernandez  [address]  [place] [address]        To Whom It May Concern:    Randy Hernandez was seen in our clinic. He may return to work with the following restrictions:  No lifting greater than 20 lbs.  No pushing/pulling greater than 25 lbs.  Okay to rotated activities/positions.   Occasional twisting, turning, walking, standing sitting, overhead reaching, stairs.  No squatting or kneeling.  No ladders.  These restrictions to be in effect for 6-8 weeks or until follow-up.      Sincerely,  Shannon Ro D.O.  [facility]  [place], [place]  [phone]

## 2021-11-23 NOTE — PATIENT INSTRUCTIONS
Randy    An order for physical therapy has been provided today.  Someone will call you to schedule physical therapy or you can call [phone] to schedule physical therapy.  It will be very important for you to do your physical therapy exercises on a regular basis to decrease your pain and prevent future flares of pain.    Please do the glute bridging exercise and I showed you.  By moving your heels closer to your body, you will work the glute muscles.  Please try to raise up to a point that your spine is in a neutral position.  This may cause some pain, so you do not need to do very many repetitions.  But by doing them more frequently, you will be able to decrease the amount of pain that you have.  When you move your heels away from your body, you will work more of the hamstring muscles.  You want to do at least 10 repetitions for each variation, ideally completing 3 sets of 10 repetitions per day.  To make this exercise harder, you can do it on just one leg, raising the other leg up in the air.  If that becomes too easy, then you can lay on the floor and put your heels up on a chair and then do the exercises.  If that becomes too easy, then you can do it with a single leg up on a chair.    Please use a lacrosse ball to roll out your gluteal muscles.    I will plan to follow-up with you in approximately 6 to 8 weeks (sooner if you can get into physical therapy sooner)

## 2021-11-23 NOTE — LETTER
11/23/2021         RE: Randy Hernandez  [address]  [place] [address]        Dear Colleague,    Thank you for referring your patient, Randy Hernandez, to the [facility]. Please see a copy of my visit note below.    Assessment:   Randy Hernandez is a 36 year old y.o. male with past medical history significant for asthma, irritable bowel syndrome, acid reflux, brain aneurysm who presents today for follow-up regarding acute right-sided low back pain with right radicular/sciatic type leg symptoms that started after work injury on August 31 of 2021.  At that time he was lifting a 350 pound piece of equipment.  His MRI did show a slight disc protrusion at the L4-5 level with lateral recess stenosis, but this was thought to be more of an incidental finding given that his pain was resolving.  His pain was thought to be most consistent with a soft tissue injury of the lumbar sprain/strain.  He had a recent flare of his pain about 2 to 3 weeks ago that has now resolved and he is back at baseline from where he was out of his last visit.  He does have a significant amount of myofascial pain in the glutes medius area.  The pain is manageable at this time.  He remains neurologically intact and without any red flag symptoms.    PSP:  Dr. Ro       Plan:     A shared decision making plan was used.  The patient's values and choices were respected.  The following represents what was discussed and decided upon by the physician and the patient.      1.  DIAGNOSTIC TESTS: The patient's MRI of the lumbar spine from October 15, 2021 was personally reviewed today by the physician with the physician performing her own interpretation.  There is a slight disc protrusion at the L4-5 level which causes lateral recess stenosis but no central canal stenosis or neural foraminal stenosis at this level.  There is a slight disc bulge with slight disc degeneration at the L5-S1 level but there  is no lateral recess stenosis, central canal stenosis, or neuroforaminal stenosis noted.  There is minimal facet arthropathy seen at the L3-4, L4-5, L5-S1 levels.  This is a largely normal MRI.  -His work comp required a urine drug screen today.  This was performed by the [facility] staff.  2.  PHYSICAL THERAPY: At the last visit, the patient was encouraged to return to physical therapy.  At this time, has been going to [facility].  However I am unimpressed with his physical therapy program.  They have been doing more passive modalities such as decompression, traction, TENS, walking on the treadmill.  I specifically asked for a home exercise program that should be strength-based.  At this point he has 1 more session with [facility].  He can complete the session and then I have requested that work comp approval 4-6 sessions with the spine physical therapist here at the spine center.  I did send a message to them today requesting that they see him as soon as possible given that this is a work comp case (though I did let the patient know that they are booking out until the end of December at this point).  I have also asked him to specifically work on glutes and hamstring strengthening with him.  3.  MEDICATIONS:    -He can continue with the nabumetone 500 mg 3 times a day as needed for pain.  -He is weaning off the gabapentin at this time.  The 900 mg 3 times a day made him too tired.  He can stay on the gabapentin 300 mg once a day or he can wean completely off of it  -He was previously prescribed Norco, but was too afraid to take it.  Reports that he has taken it now, but only when he has had a bad night during a flareup.  He does not need a refill of this medication at this time.  4.  INTERVENTIONS: Injections are not deemed necessary, given that his pain has improved and is manageable.  5.  PATIENT EDUCATION:    -Did show him how to do the pelvic bridging exercise.  I showed him how to modify it to activate the  glutes and hamstring muscles.  I did modify the glutes bridging exercise that he is actually activating more of his glutes muscles.  He did report that this caused some pain.  I explained that this is somewhat normal.  He should not do very many repetitions as long as it causes pain, but the more he does this exercise less painful it should become.  He is asked to do at least 10 repetitions per set, total of 3 sets per day with each variation (total of 6 sets).  He can make this harder by elevating his feet up on a chair or by doing them single-leg.  -He was shown how to use a lacrosse ball to roll out the gluteus medius area.  He is asked to do this for 30 to 60 seconds 2-3 times per day.  He should try to roll twice as long on the right side compared to the left  -At the last visit, his work restrictions stated that he could carry 0 to 10 pounds with occasional lifting of 11 to 20 pounds.  No lifting greater than 20 pounds.  He can push/pull occasionally up to 25 pounds but not greater than 25 pounds.  He can twist/turn occasionally.  He can occasionally use the stairs.  He is not to kneel, squat, use a ladder.  He can rotate activities and physicians continuously.  He can do frequent overhead reaching, frequent standing, frequent walking, occasional sitting.  At this time, I am going to keep the restrictions the same till he can complete the strength-based physical therapy.  His QR C did asked that I take over the restrictions which I am happy to do at this point  -He is going to make an appointment with his primary care physician regarding the work restrictions.  I explained that since I am taking these over he does not need to have that appointment unless he has other health concerns that he would like to discuss with his primary care physician.  6.  FOLLOW-UP: Approximately 4 to 6 weeks (after he has completed physical therapy).  If there are any questions/concerns or any significant worsening of pain prior to  that time, the patient is asked to call the clinic via the nurse navigation line or via [employer].     A total of 28 minutes was spent in the care of this patient on the day of service.    Subjective:     Randy Hernandez is a 36 year old male who presents today for follow-up regarding subacute right greater than left low back pain with previous sciatica type symptoms.  Patient reports that about a week after his last visit, he did have a flareup of his pain.  He says that he was coughing after drinking some water and then he had a severe flareup.  Reports that the pain was pretty bad for 4 days, and that has been improving since that time.  Reports that today he feels like the pain is back at the level that it was bad at his previous visit.  He says that it was frustrating to have a flareup like that.    He has been going to physical therapy.  They have been doing more passive treatment such as decompression, traction, using a TENS unit.  They have him walk on a treadmill.  He does stretching/range of motion exercises but he is really not able to describe or demonstrate any strengthening exercises.    He says that his work was not able to accommodate his restrictions so he has continued to be off work at this point.    This current pain is located across the low back.  He has had some pain that radiates into the right lateral hip area.  It does not go down the right leg.  He denies any numbness tingling or weakness in the legs.  He rates his pain today at a 3 out of 10.  At worst it is a 10 out of 10.  At best is a 1 out of 10.  His pain is worse with increased activity.  Sitting or standing in the same position for long periods can aggravate the pain.  Walking can also aggravate the right hip pain.  He does feel better with stretching, laying down.  The decompression and traction physical therapy do seem to be helping.  He states that he tried the gabapentin 900 mg 3 times a day but it made him very tired so he is  weaned down to 300 mg once a day.  He is taking the nabumetone about once a day as needed for pain.  He did take the Norco a couple of times at night during the flareup.  He reports that otherwise he does not take the Norco on a regular basis.    Past medical history is reviewed and is unchanged for any new medical diagnoses in the interim.      Family history is reviewed and is unchanged in the interim.      Review of Systems:  Positive for occasional headaches..  Pertinent negatives include no numbness, tingling, weakness, fevers, chills, unexplained weight loss, bowel incontinence, bladder incontinence, trips, stumbles, falls.  All others reviewed and are negative.     Objective:   CONSTITUTIONAL:  Vital signs as above.  No acute distress.  The patient is well nourished and well groomed.    PSYCHIATRIC:  The patient is awake, alert, oriented to person, place and time.  The patient is answering questions appropriately with clear speech.  Normal affect.  SKIN:  Skin over the face, posterior torso, bilateral upper and lower extremities is clean, dry, intact without rashes.  MUSCULOSKELETAL:  Gait is non-antalgic.  The patient is able to heel and toe walk without any difficulty.  Mild tenderness over the bilateral lumbar paraspinal muscles with significant tenderness over the bilateral gluteus medius muscles.  He does report some pain in the right buttock and right hip area with lumbar flexion.  Mild discomfort with lumbar extension.  He does have pain with right trunk rotation and left lumbar sidebending..      The patient has 5/5 strength for the bilateral hip flexors, knee flexors/extensors, ankle dorsiflexors/plantar flexors, ankle evertors/invertors.    NEUROLOGICAL: 2+/4 patellar, medial hamstring, achilles reflexes which are symmetric bilaterally.  No ankle clonus bilaterally.  Sensation to light touch is mildly impaired in the right L5 dermatome compared to the left L5 dermatome.  Sensation to light touch is  intact in the bilateral L4 and S1 dermatomes             Again, thank you for allowing me to participate in the care of your patient.        Sincerely,        Heather Hein, DO

## 2021-11-26 ENCOUNTER — TRANSFERRED RECORDS (OUTPATIENT)
Dept: HEALTH INFORMATION MANAGEMENT | Facility: CLINIC | Age: 36
End: 2021-11-26
Payer: COMMERCIAL

## 2022-09-27 ENCOUNTER — TRANSFERRED RECORDS (OUTPATIENT)
Dept: HEALTH INFORMATION MANAGEMENT | Facility: CLINIC | Age: 37
End: 2022-09-27

## 2022-10-01 ENCOUNTER — HEALTH MAINTENANCE LETTER (OUTPATIENT)
Age: 37
End: 2022-10-01

## 2023-01-13 ENCOUNTER — TRANSFERRED RECORDS (OUTPATIENT)
Dept: HEALTH INFORMATION MANAGEMENT | Facility: CLINIC | Age: 38
End: 2023-01-13

## 2023-02-04 ENCOUNTER — HEALTH MAINTENANCE LETTER (OUTPATIENT)
Age: 38
End: 2023-02-04

## 2024-03-03 ENCOUNTER — HEALTH MAINTENANCE LETTER (OUTPATIENT)
Age: 39
End: 2024-03-03